# Patient Record
Sex: MALE | Race: BLACK OR AFRICAN AMERICAN | NOT HISPANIC OR LATINO | Employment: OTHER | URBAN - METROPOLITAN AREA
[De-identification: names, ages, dates, MRNs, and addresses within clinical notes are randomized per-mention and may not be internally consistent; named-entity substitution may affect disease eponyms.]

---

## 2020-09-08 ENCOUNTER — HOSPITAL ENCOUNTER (EMERGENCY)
Facility: HOSPITAL | Age: 65
Discharge: HOME | End: 2020-09-08
Attending: EMERGENCY MEDICINE
Payer: MEDICARE

## 2020-09-08 VITALS
RESPIRATION RATE: 18 BRPM | BODY MASS INDEX: 47.74 KG/M2 | HEIGHT: 68 IN | DIASTOLIC BLOOD PRESSURE: 60 MMHG | WEIGHT: 315 LBS | OXYGEN SATURATION: 98 % | HEART RATE: 76 BPM | SYSTOLIC BLOOD PRESSURE: 124 MMHG | TEMPERATURE: 98 F

## 2020-09-08 DIAGNOSIS — N18.6 ESRD (END STAGE RENAL DISEASE) ON DIALYSIS: ICD-10-CM

## 2020-09-08 DIAGNOSIS — N30.01 ACUTE CYSTITIS WITH HEMATURIA: Primary | ICD-10-CM

## 2020-09-08 DIAGNOSIS — Z99.2 ESRD (END STAGE RENAL DISEASE) ON DIALYSIS: ICD-10-CM

## 2020-09-08 LAB
ANION GAP SERPL CALC-SCNC: 17 MMOL/L (ref 8–16)
BACTERIA #/AREA URNS AUTO: ABNORMAL /HPF
BASOPHILS # BLD AUTO: 0.01 K/UL (ref 0–0.2)
BASOPHILS NFR BLD: 0.1 % (ref 0–1.9)
BILIRUB UR QL STRIP: NEGATIVE
BUN SERPL-MCNC: 97 MG/DL (ref 8–23)
CALCIUM SERPL-MCNC: 8.6 MG/DL (ref 8.7–10.5)
CHLORIDE SERPL-SCNC: 102 MMOL/L (ref 95–110)
CLARITY UR REFRACT.AUTO: ABNORMAL
CO2 SERPL-SCNC: 22 MMOL/L (ref 23–29)
COLOR UR AUTO: YELLOW
CREAT SERPL-MCNC: 8.4 MG/DL (ref 0.5–1.4)
DIFFERENTIAL METHOD: ABNORMAL
EOSINOPHIL # BLD AUTO: 0.3 K/UL (ref 0–0.5)
EOSINOPHIL NFR BLD: 3.8 % (ref 0–8)
ERYTHROCYTE [DISTWIDTH] IN BLOOD BY AUTOMATED COUNT: 17.1 % (ref 11.5–14.5)
EST. GFR  (AFRICAN AMERICAN): 7 ML/MIN/1.73 M^2
EST. GFR  (NON AFRICAN AMERICAN): 6.1 ML/MIN/1.73 M^2
GLUCOSE SERPL-MCNC: 171 MG/DL (ref 70–110)
GLUCOSE UR QL STRIP: ABNORMAL
HCT VFR BLD AUTO: 35.9 % (ref 40–54)
HGB BLD-MCNC: 10.1 G/DL (ref 14–18)
HGB UR QL STRIP: ABNORMAL
HYALINE CASTS UR QL AUTO: 2 /LPF
IMM GRANULOCYTES # BLD AUTO: 0.03 K/UL (ref 0–0.04)
IMM GRANULOCYTES NFR BLD AUTO: 0.4 % (ref 0–0.5)
KETONES UR QL STRIP: NEGATIVE
LEUKOCYTE ESTERASE UR QL STRIP: ABNORMAL
LYMPHOCYTES # BLD AUTO: 1 K/UL (ref 1–4.8)
LYMPHOCYTES NFR BLD: 13.7 % (ref 18–48)
MCH RBC QN AUTO: 26.6 PG (ref 27–31)
MCHC RBC AUTO-ENTMCNC: 28.1 G/DL (ref 32–36)
MCV RBC AUTO: 95 FL (ref 82–98)
MICROSCOPIC COMMENT: ABNORMAL
MONOCYTES # BLD AUTO: 0.5 K/UL (ref 0.3–1)
MONOCYTES NFR BLD: 6.7 % (ref 4–15)
NEUTROPHILS # BLD AUTO: 5.5 K/UL (ref 1.8–7.7)
NEUTROPHILS NFR BLD: 75.3 % (ref 38–73)
NITRITE UR QL STRIP: NEGATIVE
NRBC BLD-RTO: 0 /100 WBC
PH UR STRIP: 5 [PH] (ref 5–8)
PLATELET # BLD AUTO: 231 K/UL (ref 150–350)
PMV BLD AUTO: 10.3 FL (ref 9.2–12.9)
POTASSIUM SERPL-SCNC: 5.6 MMOL/L (ref 3.5–5.1)
PROT UR QL STRIP: ABNORMAL
RBC # BLD AUTO: 3.79 M/UL (ref 4.6–6.2)
RBC #/AREA URNS AUTO: 10 /HPF (ref 0–4)
SARS-COV-2 RDRP RESP QL NAA+PROBE: NEGATIVE
SODIUM SERPL-SCNC: 141 MMOL/L (ref 136–145)
SP GR UR STRIP: 1.01 (ref 1–1.03)
SQUAMOUS #/AREA URNS AUTO: 2 /HPF
URN SPEC COLLECT METH UR: ABNORMAL
WBC # BLD AUTO: 7.29 K/UL (ref 3.9–12.7)
WBC #/AREA URNS AUTO: >100 /HPF (ref 0–5)
WBC CLUMPS UR QL AUTO: ABNORMAL

## 2020-09-08 PROCEDURE — 85025 COMPLETE CBC W/AUTO DIFF WBC: CPT

## 2020-09-08 PROCEDURE — U0002 COVID-19 LAB TEST NON-CDC: HCPCS

## 2020-09-08 PROCEDURE — 99284 PR EMERGENCY DEPT VISIT,LEVEL IV: ICD-10-PCS | Mod: ,,, | Performed by: EMERGENCY MEDICINE

## 2020-09-08 PROCEDURE — 96365 THER/PROPH/DIAG IV INF INIT: CPT

## 2020-09-08 PROCEDURE — 99284 EMERGENCY DEPT VISIT MOD MDM: CPT | Mod: 25

## 2020-09-08 PROCEDURE — 80048 BASIC METABOLIC PNL TOTAL CA: CPT

## 2020-09-08 PROCEDURE — 99284 EMERGENCY DEPT VISIT MOD MDM: CPT | Mod: ,,, | Performed by: EMERGENCY MEDICINE

## 2020-09-08 PROCEDURE — 81001 URINALYSIS AUTO W/SCOPE: CPT

## 2020-09-08 PROCEDURE — 63600175 PHARM REV CODE 636 W HCPCS: Performed by: EMERGENCY MEDICINE

## 2020-09-08 PROCEDURE — 87086 URINE CULTURE/COLONY COUNT: CPT

## 2020-09-08 RX ORDER — DULOXETIN HYDROCHLORIDE 60 MG/1
60 CAPSULE, DELAYED RELEASE ORAL DAILY
COMMUNITY

## 2020-09-08 RX ORDER — OXYCODONE AND ACETAMINOPHEN 10; 325 MG/1; MG/1
1 TABLET ORAL EVERY 12 HOURS PRN
COMMUNITY

## 2020-09-08 RX ORDER — LOSARTAN POTASSIUM 25 MG/1
25 TABLET ORAL DAILY
COMMUNITY

## 2020-09-08 RX ORDER — INSULIN LISPRO 100 [IU]/ML
5 INJECTION, SOLUTION INTRAVENOUS; SUBCUTANEOUS
COMMUNITY

## 2020-09-08 RX ORDER — CEPHALEXIN 500 MG/1
500 CAPSULE ORAL EVERY 12 HOURS
Qty: 20 CAPSULE | Refills: 0 | Status: ON HOLD | OUTPATIENT
Start: 2020-09-08 | End: 2020-09-11 | Stop reason: HOSPADM

## 2020-09-08 RX ORDER — CALCIUM ACETATE 667 MG/1
2001 CAPSULE ORAL
COMMUNITY

## 2020-09-08 RX ORDER — INSULIN GLARGINE 100 [IU]/ML
25 INJECTION, SOLUTION SUBCUTANEOUS DAILY
COMMUNITY

## 2020-09-08 RX ORDER — FAMOTIDINE 20 MG/1
20 TABLET, FILM COATED ORAL DAILY
COMMUNITY

## 2020-09-08 RX ADMIN — CEFTRIAXONE 2 G: 2 INJECTION, SOLUTION INTRAVENOUS at 03:09

## 2020-09-08 NOTE — ED NOTES
Roddy Justice, a 65 y.o. male presents to the ED via personal transporation with CC states he is in town due to his nephew not doing well and needs to have dialysis.  Patient is from [place], [place] and he has dialysis and normally goes Tues, Thursday, and Saturday. This past Saturday.       Patient identifiers verified verbally with patient and correct for Roddy Justice.

## 2020-09-09 LAB — BACTERIA UR CULT: NO GROWTH

## 2020-09-09 NOTE — ED PROVIDER NOTES
Encounter Date: 9/8/2020       History     Chief Complaint   Patient presents with    Referral     Pt sent to ED for eval for dialysis.  Pt is from out of [facility] and reports that he needs covid test and cxr to get switched to her for dialysis.  Pt denies symptoms.  Last dialysis Sat.  Normal dialysis T,Th Sat     65-year-old male presents needing clearance for dialysis.  He is visiting from out of town.  He has a history of end-stage renal disease and is chronically on dialysis.  He has been trying to arrange follow-up with his home center.  They state he needs labs, chest x-ray, and a corona test to be able to get dialysis in oral and.  Patient was recently treated for a UTI and has persistent dysuria.  Besides this he denies nausea, vomiting, diarrhea, fever, cough, shortness of breath, chest pain, or abdominal pain.  He denies any other acute medical complaint.  A ten point review of systems was completed and is negative except as documented above.  The patients available PMH, PSH, Social History, medications, allergies, and triage vital signs were reviewed just prior to their medical evaluation.        Review of patient's allergies indicates:  No Known Allergies  Past Medical History:   Diagnosis Date    A-fib     COPD (chronic obstructive pulmonary disease)     Diabetes mellitus     Renal disorder      No past surgical history on file.  No family history on file.  Social History     Tobacco Use    Smoking status: Never Smoker    Smokeless tobacco: Never Used   Substance Use Topics    Alcohol use: Not Currently    Drug use: Never     Review of Systems   Constitutional: Negative for fever.   HENT: Negative for sore throat.    Eyes: Negative for visual disturbance.   Respiratory: Negative for cough and shortness of breath.    Cardiovascular: Negative for chest pain.   Gastrointestinal: Negative for abdominal pain, diarrhea, nausea and vomiting.   Genitourinary: Positive for dysuria.   Musculoskeletal:  Negative for neck pain.   Skin: Negative for rash and wound.   Allergic/Immunologic: Negative for immunocompromised state.   Neurological: Negative for syncope.   Psychiatric/Behavioral: Negative for confusion.       Physical Exam     Initial Vitals [09/08/20 1200]   BP Pulse Resp Temp SpO2   (!) 150/67 102 20 97.7 °F (36.5 °C) 96 %      MAP       --         Physical Exam    Nursing note and vitals reviewed.  Constitutional: He appears well-developed and well-nourished. He is not diaphoretic. No distress.   HENT:   Head: Normocephalic and atraumatic.   Nose: Nose normal.   Eyes: Conjunctivae are normal. Right eye exhibits no discharge. Left eye exhibits no discharge.   Neck: Normal range of motion. Neck supple.   Cardiovascular: Normal rate, regular rhythm and normal heart sounds. Exam reveals no gallop and no friction rub.    No murmur heard.  Pulmonary/Chest: Breath sounds normal. No respiratory distress. He has no wheezes. He has no rhonchi. He has no rales.   Abdominal: Soft. He exhibits no distension. There is no abdominal tenderness. There is no rebound and no guarding.   obese   Musculoskeletal: Normal range of motion. No tenderness or edema.   Neurological: He is alert and oriented to person, place, and time. He has normal strength. GCS score is 15. GCS eye subscore is 4. GCS verbal subscore is 5. GCS motor subscore is 6.   Skin: Skin is warm and dry. No rash noted. No erythema.   Psychiatric: He has a normal mood and affect. His behavior is normal. Judgment and thought content normal.         ED Course   Procedures  Labs Reviewed   CBC W/ AUTO DIFFERENTIAL - Abnormal; Notable for the following components:       Result Value    RBC 3.79 (*)     Hemoglobin 10.1 (*)     Hematocrit 35.9 (*)     Mean Corpuscular Hemoglobin 26.6 (*)     Mean Corpuscular Hemoglobin Conc 28.1 (*)     RDW 17.1 (*)     Gran% 75.3 (*)     Lymph% 13.7 (*)     All other components within normal limits   BASIC METABOLIC PANEL - Abnormal;  Notable for the following components:    Potassium 5.6 (*)     CO2 22 (*)     Glucose 171 (*)     BUN, Bld 97 (*)     Creatinine 8.4 (*)     Calcium 8.6 (*)     Anion Gap 17 (*)     eGFR if  7.0 (*)     eGFR if non  6.1 (*)     All other components within normal limits   URINALYSIS, REFLEX TO URINE CULTURE - Abnormal; Notable for the following components:    Appearance, UA Cloudy (*)     Protein, UA 2+ (*)     Glucose, UA 1+ (*)     Occult Blood UA 1+ (*)     Leukocytes, UA 3+ (*)     All other components within normal limits    Narrative:     Specimen Source->Urine   URINALYSIS MICROSCOPIC - Abnormal; Notable for the following components:    RBC, UA 10 (*)     WBC, UA >100 (*)     Hyaline Casts, UA 2 (*)     All other components within normal limits    Narrative:     Specimen Source->Urine   CULTURE, URINE   SARS-COV-2 RNA AMPLIFICATION, QUAL          Imaging Results          X-Ray Chest AP Portable (Final result)  Result time 09/08/20 14:20:11    Final result by Andrew Bowman MD (09/08/20 14:20:11)                 Impression:      Findings suggestive of central vascular congestion.  Perihilar interstitial opacities could reflect edema, although infectious or inflammatory infiltrate could appear similar      Electronically signed by: Andrew Bowman  Date:    09/08/2020  Time:    14:20             Narrative:    EXAMINATION:  XR CHEST AP PORTABLE    CLINICAL HISTORY:  End stage renal disease    TECHNIQUE:  Single frontal view of the chest was performed.    COMPARISON:  Chest radiograph performed 07/11/2017    FINDINGS:  Cardiomediastinal silhouette appears unchanged, the borderline cardiomegaly.  Elevation of the right hemidiaphragm, as before.  Ill-defined perihilar interstitial opacities are noted with prominence of the central pulmonary vasculature.  No focal airspace consolidation is suggested.  No definite pneumothorax or pleural effusion.  Visualized upper abdomen without  definite acute findings.  Soft tissues and bones without acute change.                                 Medical Decision Making:   History:   Old Medical Records: I decided to obtain old medical records.  Clinical Tests:   Lab Tests: Ordered and Reviewed  Radiological Study: Ordered and Reviewed  ED Management:  65-year-old male presents with dysuria and needing medical clearance for dialysis.  Vitals normal.  Physical exam benign.  Labs consistent with end-stage renal disease and likely UTI.  Treated with antibiotics.  Coronavirus negative and chest x-ray unremarkable.  Given results to supplied to outpatient dialysis center.  He has not missed any dialysis sessions and thus does not need emergent dialysis.  He will return to the ED if unable to get dialysis.  Patient will return to ED for worsening symptoms, inability to eat/drink, fever greater than 100.4, or any other concerns.  Did bedside teaching with return precautions.  All questions answered.  The patient acknowledges understanding.  Gave verbal discharge instructions.                             Clinical Impression:   Final diagnoses:  [N18.6, Z99.2] ESRD (end stage renal disease) on dialysis  [N30.01] Acute cystitis with hematuria (Primary)      Disposition:   Disposition: Discharged  Condition: Stable     ED Disposition Condition    Discharge Stable        ED Prescriptions     Medication Sig Dispense Start Date End Date Auth. Provider    cephALEXin (KEFLEX) 500 MG capsule Take 1 capsule (500 mg total) by mouth every 12 (twelve) hours. On dialysis days, take after dialysis for 10 days 20 capsule 9/8/2020 9/18/2020 Sukhjinder Khan MD        Follow-up Information     Follow up With Specialties Details Why Contact Info    Follow up with primary physician as soon as possible.  Call tomorrow for an appointment.        [facility]  Return to ED for worsening symptoms, inability to eat/drink, fever greater than 100.4, or  any other concerns. [address] [facility] [id number]  [phone]                                       Sukhjinder Khan MD  09/09/20 0912

## 2020-09-10 ENCOUNTER — HOSPITAL ENCOUNTER (OUTPATIENT)
Facility: OTHER | Age: 65
Discharge: HOME | End: 2020-09-11
Attending: EMERGENCY MEDICINE | Admitting: INTERNAL MEDICINE
Payer: MEDICARE

## 2020-09-10 DIAGNOSIS — E87.5 HYPERKALEMIA: ICD-10-CM

## 2020-09-10 DIAGNOSIS — N18.6 ESRD (END STAGE RENAL DISEASE): ICD-10-CM

## 2020-09-10 DIAGNOSIS — Z78.9 PROBLEM WITH VASCULAR ACCESS: Primary | ICD-10-CM

## 2020-09-10 PROBLEM — J44.9 COPD (CHRONIC OBSTRUCTIVE PULMONARY DISEASE): Status: ACTIVE | Noted: 2020-09-10

## 2020-09-10 PROBLEM — G47.33 OSA (OBSTRUCTIVE SLEEP APNEA): Status: ACTIVE | Noted: 2018-03-14

## 2020-09-10 PROBLEM — I48.0 PAROXYSMAL ATRIAL FIBRILLATION: Status: ACTIVE | Noted: 2020-09-10

## 2020-09-10 PROBLEM — E11.9 TYPE 2 DIABETES MELLITUS, WITH LONG-TERM CURRENT USE OF INSULIN: Status: ACTIVE | Noted: 2020-09-10

## 2020-09-10 PROBLEM — I48.91 ATRIAL FIBRILLATION: Status: ACTIVE | Noted: 2020-09-10

## 2020-09-10 PROBLEM — I10 ESSENTIAL HYPERTENSION: Status: ACTIVE | Noted: 2020-09-10

## 2020-09-10 PROBLEM — Z79.4 TYPE 2 DIABETES MELLITUS, WITH LONG-TERM CURRENT USE OF INSULIN: Status: ACTIVE | Noted: 2020-09-10

## 2020-09-10 PROBLEM — I50.32 CHRONIC DIASTOLIC CHF (CONGESTIVE HEART FAILURE): Status: ACTIVE | Noted: 2020-09-10

## 2020-09-10 LAB
ALBUMIN SERPL BCP-MCNC: 3.3 G/DL (ref 3.5–5.2)
ALP SERPL-CCNC: 92 U/L (ref 55–135)
ALT SERPL W/O P-5'-P-CCNC: 11 U/L (ref 10–44)
ANION GAP SERPL CALC-SCNC: 18 MMOL/L (ref 8–16)
AST SERPL-CCNC: 18 U/L (ref 10–40)
BASOPHILS # BLD AUTO: 0.01 K/UL (ref 0–0.2)
BASOPHILS NFR BLD: 0.1 % (ref 0–1.9)
BILIRUB SERPL-MCNC: 0.5 MG/DL (ref 0.1–1)
BUN SERPL-MCNC: 112 MG/DL (ref 8–23)
CALCIUM SERPL-MCNC: 8.7 MG/DL (ref 8.7–10.5)
CHLORIDE SERPL-SCNC: 102 MMOL/L (ref 95–110)
CO2 SERPL-SCNC: 18 MMOL/L (ref 23–29)
CREAT SERPL-MCNC: 9 MG/DL (ref 0.5–1.4)
DIFFERENTIAL METHOD: ABNORMAL
EOSINOPHIL # BLD AUTO: 0.4 K/UL (ref 0–0.5)
EOSINOPHIL NFR BLD: 4.5 % (ref 0–8)
ERYTHROCYTE [DISTWIDTH] IN BLOOD BY AUTOMATED COUNT: 16.9 % (ref 11.5–14.5)
EST. GFR  (AFRICAN AMERICAN): 6 ML/MIN/1.73 M^2
EST. GFR  (NON AFRICAN AMERICAN): 6 ML/MIN/1.73 M^2
GLUCOSE SERPL-MCNC: 119 MG/DL (ref 70–110)
HCT VFR BLD AUTO: 35 % (ref 40–54)
HCT VFR BLD CALC: 34 %PCV (ref 36–54)
HGB BLD-MCNC: 10.1 G/DL (ref 14–18)
HGB BLD-MCNC: 12 G/DL
IMM GRANULOCYTES # BLD AUTO: 0.03 K/UL (ref 0–0.04)
IMM GRANULOCYTES NFR BLD AUTO: 0.4 % (ref 0–0.5)
LYMPHOCYTES # BLD AUTO: 0.6 K/UL (ref 1–4.8)
LYMPHOCYTES NFR BLD: 7.9 % (ref 18–48)
MCH RBC QN AUTO: 26.6 PG (ref 27–31)
MCHC RBC AUTO-ENTMCNC: 28.9 G/DL (ref 32–36)
MCV RBC AUTO: 92 FL (ref 82–98)
MONOCYTES # BLD AUTO: 0.6 K/UL (ref 0.3–1)
MONOCYTES NFR BLD: 7.7 % (ref 4–15)
NEUTROPHILS # BLD AUTO: 6.2 K/UL (ref 1.8–7.7)
NEUTROPHILS NFR BLD: 79.4 % (ref 38–73)
NRBC BLD-RTO: 0 /100 WBC
PLATELET # BLD AUTO: 205 K/UL (ref 150–350)
PLATELET BLD QL SMEAR: ABNORMAL
PMV BLD AUTO: 9.8 FL (ref 9.2–12.9)
POC IONIZED CALCIUM: 1.13 MMOL/L (ref 1.06–1.42)
POCT GLUCOSE: 112 MG/DL (ref 70–110)
POCT GLUCOSE: 58 MG/DL (ref 70–110)
POLYCHROMASIA BLD QL SMEAR: ABNORMAL
POTASSIUM BLD-SCNC: 5.6 MMOL/L (ref 3.5–5.1)
POTASSIUM SERPL-SCNC: 6.1 MMOL/L (ref 3.5–5.1)
PROT SERPL-MCNC: 7.7 G/DL (ref 6–8.4)
RBC # BLD AUTO: 3.8 M/UL (ref 4.6–6.2)
SAMPLE: ABNORMAL
SITE: ABNORMAL
SODIUM BLD-SCNC: 138 MMOL/L (ref 136–145)
SODIUM SERPL-SCNC: 138 MMOL/L (ref 136–145)
WBC # BLD AUTO: 7.81 K/UL (ref 3.9–12.7)

## 2020-09-10 PROCEDURE — 27000190 HC CPAP FULL FACE MASK W/VALVE

## 2020-09-10 PROCEDURE — 85025 COMPLETE CBC W/AUTO DIFF WBC: CPT

## 2020-09-10 PROCEDURE — G0257 UNSCHED DIALYSIS ESRD PT HOS: HCPCS

## 2020-09-10 PROCEDURE — C9399 UNCLASSIFIED DRUGS OR BIOLOG: HCPCS | Performed by: INTERNAL MEDICINE

## 2020-09-10 PROCEDURE — 99220 PR INITIAL OBSERVATION CARE,LEVL III: CPT | Mod: ,,, | Performed by: INTERNAL MEDICINE

## 2020-09-10 PROCEDURE — 93005 ELECTROCARDIOGRAM TRACING: CPT

## 2020-09-10 PROCEDURE — 85014 HEMATOCRIT: CPT

## 2020-09-10 PROCEDURE — G0378 HOSPITAL OBSERVATION PER HR: HCPCS

## 2020-09-10 PROCEDURE — 84132 ASSAY OF SERUM POTASSIUM: CPT

## 2020-09-10 PROCEDURE — 93010 ELECTROCARDIOGRAM REPORT: CPT | Mod: ,,, | Performed by: INTERNAL MEDICINE

## 2020-09-10 PROCEDURE — 96372 THER/PROPH/DIAG INJ SC/IM: CPT

## 2020-09-10 PROCEDURE — 80053 COMPREHEN METABOLIC PANEL: CPT

## 2020-09-10 PROCEDURE — 90935 HEMODIALYSIS ONE EVALUATION: CPT

## 2020-09-10 PROCEDURE — 94660 CPAP INITIATION&MGMT: CPT

## 2020-09-10 PROCEDURE — 25000003 PHARM REV CODE 250: Performed by: NURSE PRACTITIONER

## 2020-09-10 PROCEDURE — 99285 EMERGENCY DEPT VISIT HI MDM: CPT | Mod: 25

## 2020-09-10 PROCEDURE — 93010 EKG 12-LEAD: ICD-10-PCS | Mod: ,,, | Performed by: INTERNAL MEDICINE

## 2020-09-10 PROCEDURE — 82803 BLOOD GASES ANY COMBINATION: CPT

## 2020-09-10 PROCEDURE — 94761 N-INVAS EAR/PLS OXIMETRY MLT: CPT

## 2020-09-10 PROCEDURE — 84295 ASSAY OF SERUM SODIUM: CPT

## 2020-09-10 PROCEDURE — 99900035 HC TECH TIME PER 15 MIN (STAT)

## 2020-09-10 PROCEDURE — 82330 ASSAY OF CALCIUM: CPT

## 2020-09-10 PROCEDURE — 25000003 PHARM REV CODE 250: Performed by: INTERNAL MEDICINE

## 2020-09-10 PROCEDURE — 99220 PR INITIAL OBSERVATION CARE,LEVL III: ICD-10-PCS | Mod: ,,, | Performed by: INTERNAL MEDICINE

## 2020-09-10 RX ORDER — INSULIN ASPART 100 [IU]/ML
0-5 INJECTION, SOLUTION INTRAVENOUS; SUBCUTANEOUS
Status: DISCONTINUED | OUTPATIENT
Start: 2020-09-10 | End: 2020-09-11 | Stop reason: HOSPADM

## 2020-09-10 RX ORDER — SODIUM CHLORIDE 0.9 % (FLUSH) 0.9 %
10 SYRINGE (ML) INJECTION
Status: DISCONTINUED | OUTPATIENT
Start: 2020-09-10 | End: 2020-09-10

## 2020-09-10 RX ORDER — FLUTICASONE FUROATE AND VILANTEROL 100; 25 UG/1; UG/1
1 POWDER RESPIRATORY (INHALATION) DAILY
Status: DISCONTINUED | OUTPATIENT
Start: 2020-09-10 | End: 2020-09-11 | Stop reason: HOSPADM

## 2020-09-10 RX ORDER — ATORVASTATIN CALCIUM 10 MG/1
10 TABLET, FILM COATED ORAL NIGHTLY
Status: DISCONTINUED | OUTPATIENT
Start: 2020-09-10 | End: 2020-09-11 | Stop reason: HOSPADM

## 2020-09-10 RX ORDER — SODIUM CHLORIDE 0.9 % (FLUSH) 0.9 %
10 SYRINGE (ML) INJECTION
Status: DISCONTINUED | OUTPATIENT
Start: 2020-09-10 | End: 2020-09-11 | Stop reason: HOSPADM

## 2020-09-10 RX ORDER — SODIUM CHLORIDE 9 MG/ML
INJECTION, SOLUTION INTRAVENOUS ONCE
Status: DISCONTINUED | OUTPATIENT
Start: 2020-09-10 | End: 2020-09-11 | Stop reason: HOSPADM

## 2020-09-10 RX ORDER — MUPIROCIN 20 MG/G
OINTMENT TOPICAL 2 TIMES DAILY
Status: DISCONTINUED | OUTPATIENT
Start: 2020-09-10 | End: 2020-09-11 | Stop reason: HOSPADM

## 2020-09-10 RX ORDER — ATORVASTATIN CALCIUM 10 MG/1
10 TABLET, FILM COATED ORAL NIGHTLY
COMMUNITY
Start: 2020-09-05

## 2020-09-10 RX ORDER — IBUPROFEN 200 MG
24 TABLET ORAL
Status: DISCONTINUED | OUTPATIENT
Start: 2020-09-10 | End: 2020-09-11 | Stop reason: HOSPADM

## 2020-09-10 RX ORDER — ACETAMINOPHEN 325 MG/1
650 TABLET ORAL EVERY 8 HOURS PRN
Status: DISCONTINUED | OUTPATIENT
Start: 2020-09-10 | End: 2020-09-10

## 2020-09-10 RX ORDER — ALBUTEROL SULFATE 90 UG/1
2 AEROSOL, METERED RESPIRATORY (INHALATION) EVERY 4 HOURS PRN
Status: DISCONTINUED | OUTPATIENT
Start: 2020-09-10 | End: 2020-09-11 | Stop reason: HOSPADM

## 2020-09-10 RX ORDER — DULOXETIN HYDROCHLORIDE 30 MG/1
60 CAPSULE, DELAYED RELEASE ORAL DAILY
Status: DISCONTINUED | OUTPATIENT
Start: 2020-09-10 | End: 2020-09-11 | Stop reason: HOSPADM

## 2020-09-10 RX ORDER — OXYCODONE AND ACETAMINOPHEN 10; 325 MG/1; MG/1
1 TABLET ORAL EVERY 12 HOURS PRN
Status: DISCONTINUED | OUTPATIENT
Start: 2020-09-10 | End: 2020-09-11 | Stop reason: HOSPADM

## 2020-09-10 RX ORDER — LOSARTAN POTASSIUM 25 MG/1
25 TABLET ORAL DAILY
Status: DISCONTINUED | OUTPATIENT
Start: 2020-09-10 | End: 2020-09-11 | Stop reason: HOSPADM

## 2020-09-10 RX ORDER — METOPROLOL TARTRATE 25 MG/1
25 TABLET, FILM COATED ORAL 2 TIMES DAILY
COMMUNITY
Start: 2020-08-09

## 2020-09-10 RX ORDER — IBUPROFEN 200 MG
16 TABLET ORAL
Status: DISCONTINUED | OUTPATIENT
Start: 2020-09-10 | End: 2020-09-11 | Stop reason: HOSPADM

## 2020-09-10 RX ORDER — ALBUTEROL SULFATE 90 UG/1
1 AEROSOL, METERED RESPIRATORY (INHALATION) EVERY 4 HOURS PRN
COMMUNITY
End: 2020-09-10 | Stop reason: CLARIF

## 2020-09-10 RX ORDER — GLUCAGON 1 MG
1 KIT INJECTION
Status: DISCONTINUED | OUTPATIENT
Start: 2020-09-10 | End: 2020-09-11 | Stop reason: HOSPADM

## 2020-09-10 RX ORDER — ACETAMINOPHEN 325 MG/1
650 TABLET ORAL EVERY 4 HOURS PRN
Status: DISCONTINUED | OUTPATIENT
Start: 2020-09-10 | End: 2020-09-11 | Stop reason: HOSPADM

## 2020-09-10 RX ORDER — CALCIUM ACETATE 667 MG/1
2001 CAPSULE ORAL
Status: DISCONTINUED | OUTPATIENT
Start: 2020-09-10 | End: 2020-09-11 | Stop reason: HOSPADM

## 2020-09-10 RX ORDER — METOPROLOL TARTRATE 25 MG/1
25 TABLET, FILM COATED ORAL 2 TIMES DAILY
Status: DISCONTINUED | OUTPATIENT
Start: 2020-09-10 | End: 2020-09-11 | Stop reason: HOSPADM

## 2020-09-10 RX ORDER — ALBUTEROL SULFATE 90 UG/1
2 AEROSOL, METERED RESPIRATORY (INHALATION) EVERY 4 HOURS PRN
COMMUNITY

## 2020-09-10 RX ORDER — FAMOTIDINE 20 MG/1
20 TABLET, FILM COATED ORAL DAILY
Status: DISCONTINUED | OUTPATIENT
Start: 2020-09-10 | End: 2020-09-11 | Stop reason: HOSPADM

## 2020-09-10 RX ORDER — ONDANSETRON 2 MG/ML
4 INJECTION INTRAMUSCULAR; INTRAVENOUS EVERY 6 HOURS PRN
Status: DISCONTINUED | OUTPATIENT
Start: 2020-09-10 | End: 2020-09-11 | Stop reason: HOSPADM

## 2020-09-10 RX ORDER — INSULIN ASPART 100 [IU]/ML
5 INJECTION, SOLUTION INTRAVENOUS; SUBCUTANEOUS
Status: DISCONTINUED | OUTPATIENT
Start: 2020-09-10 | End: 2020-09-11 | Stop reason: HOSPADM

## 2020-09-10 RX ORDER — FLUTICASONE PROPIONATE AND SALMETEROL 100; 50 UG/1; UG/1
1 POWDER RESPIRATORY (INHALATION) 2 TIMES DAILY
COMMUNITY

## 2020-09-10 RX ADMIN — SODIUM ZIRCONIUM CYCLOSILICATE 10 G: 10 POWDER, FOR SUSPENSION ORAL at 11:09

## 2020-09-10 RX ADMIN — MUPIROCIN: 20 OINTMENT TOPICAL at 08:09

## 2020-09-10 RX ADMIN — CALCIUM ACETATE 2001 MG: 667 CAPSULE ORAL at 08:09

## 2020-09-10 RX ADMIN — ATORVASTATIN CALCIUM 10 MG: 10 TABLET, FILM COATED ORAL at 08:09

## 2020-09-10 RX ADMIN — METOPROLOL TARTRATE 25 MG: 25 TABLET, FILM COATED ORAL at 08:09

## 2020-09-10 RX ADMIN — INSULIN DETEMIR 15 UNITS: 100 INJECTION, SOLUTION SUBCUTANEOUS at 09:09

## 2020-09-10 RX ADMIN — APIXABAN 5 MG: 2.5 TABLET, FILM COATED ORAL at 08:09

## 2020-09-10 RX ADMIN — FAMOTIDINE 20 MG: 20 TABLET ORAL at 08:09

## 2020-09-10 NOTE — ASSESSMENT & PLAN NOTE
Unable to access AV fistula at outpatient dialysis unit  No issues with accessing fistula in the past. Pt reports fistula is deep making it difficult to access  To HD today to attempt to access deep AV fistula  If unable to dialyze via AV fistula, my require fistulogram or temporary tunneled catheter  Nephrology consulted. Discussed with Mike Grayson

## 2020-09-10 NOTE — PLAN OF CARE
09/10/20 1716   ALTMAN Message   Medicare Outpatient and Observation Notification regarding financial responsibility Given to patient/caregiver;Explained to patient/caregiver;Signed/date by patient/caregiver   Date ALTMAN was signed 09/10/20   Time ALTMAN was signed 1517  (approx time)

## 2020-09-10 NOTE — PLAN OF CARE
RN CM met with patient at the bedside.  Pt states he lives in [facility]  is Hd Patient there- however his nephew has brain tumor and he was visiting his brother in [place] , for a while and transferred his HD, [facility] to [facility] - but is having issues with his graft to During his HD session.    Patient is alert and oriented with no communication barriers.      Prior to admission patient was independent with ADLs. Patient denies the use of HH or DME .    Patients  Needs PCP  lives in [facility]..says he does  have one  there ?   Maybe [facility] referral for  local PCP until his return back to [facility] may help  . Patient choice pharmacy  he  is unsure.p pharmacy may work better for him      Patient denies a history of mental illness.         Patients family will transport him home at discharge.        CM team will continue to follow.      09/10/20 5897   Discharge Assessment   Assessment Type Discharge Planning Assessment   Confirmed/corrected address and phone number on facesheet? Yes   Assessment information obtained from? Patient   Communicated expected length of stay with patient/caregiver yes   Prior to hospitilization cognitive status: Alert/Oriented   Prior to hospitalization functional status: Independent;Assistive Equipment   Current cognitive status: Alert/Oriented   Current Functional Status: Independent;Assistive Equipment   Lives With significant other   Able to Return to Prior Arrangements yes   Is patient able to care for self after discharge? Yes   Readmission Within the Last 30 Days no previous admission in last 30 days   Patient currently being followed by outpatient case management? No   Patient currently receives any other outside agency services? No   Equipment Currently Used at Home cane, quad   Do you have any problems affording any of your prescribed medications? TBD   Is the patient taking medications as prescribed? yes   Does the patient have transportation home?  Yes   Does the patient receive services at the [facility]? No   Discharge Plan A Home with family   Discharge Plan B Home with family   DME Needed Upon Discharge  none   Patient/Family in Agreement with Plan yes

## 2020-09-10 NOTE — H&P
[facility] Hospital Medicine  History & Physical    Patient Name: Roddy Justice  MRN: [id number]  Admission Date: 9/10/2020  Attending Physician: Karina Salmon MD   Primary Care Provider: Primary Doctor No         Patient information was obtained from patient, past medical records and ER records.     Subjective:     Principal Problem:Problem with vascular access    Chief Complaint:   Chief Complaint   Patient presents with    Vascular Access Problem     unable to get dialysis today because port was clogged. last dialysis last Saturday.         HPI: 66 y/o M with history of ESRD on TTS dialysis presents to the ED today from outpatient dialysis unit with difficulty accessing AV fistula. He is visiting from [place], [place] due to a sick nephew. He went to outpatient unit today and they were unable to access his fistula so he was referred to the ED. He complains of worsening SOB over the past 3 days due to missed HD. His last dialysis was 5 days ago. He was seen in the ED at [facility] on 9/8 because he needed a COVID test prior to having dialysis. At the time he also had dysuria, treated for UTI and symptoms have since resolved. He denies chest pain, cough, fever, chills, nausea or vomiting.    He has history of diastolic CHF, COPD, type 2 diabetes mellitus, ESRD on TTS HD, HTN, atrial fibrillation on chronic anticoagulation and SILVINA on BiPAP and 3 L nasal cannula as needed at home.    Surgical history: b/l toe amputations, prior trach placement, L AV fistula  Social history: lives with his common law wife, no children. Denies alcohol or drug use. Prior tobacco user, quit 10 years ago  Family history: mother and father with DM2 and HTN      Past Medical History:   Diagnosis Date    A-fib     COPD (chronic obstructive pulmonary disease)     Diabetes mellitus     Renal disorder        Past Surgical History:   Procedure Laterality Date    avf left arm         Review of patient's allergies indicates:    Allergen Reactions    Gabapentin Anaphylaxis       No current facility-administered medications on file prior to encounter.      Current Outpatient Medications on File Prior to Encounter   Medication Sig    apixaban (ELIQUIS) 5 mg Tab Take 5 mg by mouth 2 (two) times daily.    calcium acetate,phosphat bind, (PHOSLO) 667 mg capsule Take 2,001 mg by mouth 3 (three) times daily with meals.     DULoxetine (CYMBALTA) 60 MG capsule Take 60 mg by mouth once daily.    famotidine (PEPCID) 20 MG tablet Take 20 mg by mouth once daily.     insulin (BASAGLAR KWIKPEN U-100 INSULIN) glargine 100 units/mL (3mL) SubQ pen Inject 25 Units into the skin once daily.     insulin lispro 100 unit/mL injection Inject 5 Units into the skin 3 (three) times daily before meals. Plus SSI    losartan (COZAAR) 25 MG tablet Take 25 mg by mouth once daily.    [DISCONTINUED] insulin glargine,hum.rec.anlog (BASAGLAR KWIKPEN U-100 INSULIN SUBQ) Inject 5 Units into the skin 3 (three) times daily with meals. Plus SSI    albuterol (PROVENTIL/VENTOLIN HFA) 90 mcg/actuation inhaler Inhale 2 puffs into the lungs every 4 (four) hours as needed.    atorvastatin (LIPITOR) 10 MG tablet Take 10 mg by mouth every evening.    cephALEXin (KEFLEX) 500 MG capsule Take 1 capsule (500 mg total) by mouth every 12 (twelve) hours. On dialysis days, take after dialysis for 10 days    fluticasone-salmeterol diskus inhaler 100-50 mcg Inhale 1 puff into the lungs 2 (two) times a day.    metoprolol tartrate (LOPRESSOR) 25 MG tablet Take 25 mg by mouth 2 (two) times a day.    oxyCODONE-acetaminophen (PERCOCET)  mg per tablet Take 1 tablet by mouth every 12 (twelve) hours as needed for Pain.     [DISCONTINUED] albuterol (PROVENTIL/VENTOLIN HFA) 90 mcg/actuation inhaler Inhale 1 puff into the lungs every 4 (four) hours as needed.     Family History     None        Tobacco Use    Smoking status: Never Smoker    Smokeless tobacco: Never Used   Substance and  Sexual Activity    Alcohol use: Not Currently    Drug use: Never    Sexual activity: Not on file     Review of Systems   Constitutional: Negative for chills and fever.   HENT: Negative.    Eyes: Negative.    Respiratory: Positive for shortness of breath. Negative for cough.    Cardiovascular: Negative for chest pain and palpitations.   Gastrointestinal: Negative for abdominal distention, abdominal pain, diarrhea, nausea and vomiting.   Genitourinary: Negative.    Musculoskeletal: Negative.    Neurological: Negative.    Psychiatric/Behavioral: Negative.    All other systems reviewed and are negative.    Objective:     Vital Signs (Most Recent):  Pulse: 80 (09/10/20 1234)  Resp: (!) 22 (09/10/20 1201)  BP: (!) 114/55 (09/10/20 1233)  SpO2: 95 % (09/10/20 1233) Vital Signs (24h Range):  Pulse:  [77-87] 80  Resp:  [16-22] 22  SpO2:  [95 %-99 %] 95 %  BP: (114-160)/(55-79) 114/55        There is no height or weight on file to calculate BMI.    Physical Exam  Vitals signs and nursing note reviewed.   Constitutional:       Appearance: He is well-developed. He is obese.   HENT:      Head: Normocephalic and atraumatic.   Eyes:      General:         Right eye: No discharge.         Left eye: No discharge.      Conjunctiva/sclera: Conjunctivae normal.   Neck:      Musculoskeletal: Normal range of motion and neck supple.   Cardiovascular:      Rate and Rhythm: Normal rate and regular rhythm.      Heart sounds: Normal heart sounds.   Pulmonary:      Effort: Pulmonary effort is normal. No respiratory distress.      Comments: Decreased   Abdominal:      General: Bowel sounds are normal. There is no distension.      Palpations: Abdomen is soft.      Tenderness: There is no abdominal tenderness.   Musculoskeletal: Normal range of motion.      Right lower leg: No edema.      Left lower leg: No edema.      Comments: L AV fistula - no thrill palpated but bruit auscultated   Skin:     General: Skin is warm and dry.   Neurological:       Mental Status: He is alert and oriented to person, place, and time. Mental status is at baseline.   Psychiatric:         Mood and Affect: Mood normal.         Behavior: Behavior normal.             Significant Labs:   CBC:   Recent Labs   Lab 09/10/20  0944 09/10/20  0944   WBC 7.81  --    HGB 10.1*  --    HCT 35.0* 34*     --      CMP:   Recent Labs   Lab 09/10/20  0944      K 6.1*      CO2 18*   *   *   CREATININE 9.0*   CALCIUM 8.7   PROT 7.7   ALBUMIN 3.3*   BILITOT 0.5   ALKPHOS 92   AST 18   ALT 11   ANIONGAP 18*   EGFRNONAA 6*     All pertinent labs within the past 24 hours have been reviewed.    Significant Imaging: I have reviewed and interpreted all pertinent imaging results/findings within the past 24 hours.    Assessment/Plan:     * Problem with vascular access  Unable to access AV fistula at outpatient dialysis unit  No issues with accessing fistula in the past. Pt reports fistula is deep making it difficult to access  To HD today to attempt to access deep AV fistula  If unable to dialyze via AV fistula, my require fistulogram or temporary tunneled catheter  Nephrology consulted. Discussed with Mike Grayson      Paroxysmal atrial fibrillation  Continue eliquis and metoprolol       COPD (chronic obstructive pulmonary disease)  Stable, resume home inhalers      Type 2 diabetes mellitus, with long-term current use of insulin  Start Levemir 15 units qHS, continue aspart 5 units TIDWM  Low dose SSI      ESRD (end stage renal disease)  On TTS HD  Nephrology consulted for dialysis      Chronic diastolic CHF (congestive heart failure)  Overloaded due to missed HD  Planning HD today      SILVINA (obstructive sleep apnea)  Resume BiPAP qHS      Essential hypertension  Controlled  Continue losartan and metoprolol        VTE Risk Mitigation (From admission, onward)         Ordered     apixaban tablet 5 mg  2 times daily      09/10/20 1403     Place sequential compression device   Until discontinued      09/10/20 1404     IP VTE HIGH RISK PATIENT  Once      09/10/20 1404                   Karina Salmon MD  Department of Hospital Medicine   [facility]

## 2020-09-10 NOTE — CONSULTS
Consult Note  Nephrology    Consult Requested By: Karina Salmon MD  Reason for Consult: ESRD    SUBJECTIVE:     History of Present Illness:  Patient is a 65 y.o. male presents with difficulty accessing AV fistula at outpatient dialysis unit.  Patient is here visiting from [facility] for his dying nephew.  Patient presented to [facility] with Dr. Le and dialysis nurse was unable to access venous access after numerous attempts.  Patient directed to the emergency room for evaluation.  Consulted for dialysis needs.  Discussed case with Dr. Le and emergency room physician.  Patient seen and examined.  Labs noted.  Morbidly obese gentleman in no acute distress.  Left arm AV fistula evaluated with superficial arterial thrill and bruit but extremely deep venous track.  Updated dialysis team about access issues.  Explained to patient that he may need either fistulogram or temporary tunneled catheter.  Updated [facility] interventional team.    Epic reviewed.    Currently without chest pain, nausea, vomiting, diarrhea, fever, chills.  Subjective shortness of breath.    Past Medical History:   Diagnosis Date    A-fib     COPD (chronic obstructive pulmonary disease)     Diabetes mellitus     Renal disorder      Past Surgical History:   Procedure Laterality Date    avf left arm       No family history on file.  Social History     Tobacco Use    Smoking status: Never Smoker    Smokeless tobacco: Never Used   Substance Use Topics    Alcohol use: Not Currently    Drug use: Never       Review of patient's allergies indicates:   Allergen Reactions    Gabapentin Anaphylaxis        Review of Systems:  Constitutional: No fever or chills  Respiratory: shortness of breath  Cardiovascular: No chest pain or palpitations  Gastrointestinal: No nausea or vomiting  Neurological: No confusion or weakness    OBJECTIVE:     Vital Signs (Most Recent)  Pulse: 82 (09/10/20 1021)  Resp: 17 (09/10/20 0956)  BP: 137/79 (09/10/20  1021)  SpO2: 96 % (09/10/20 1021)    Vital Signs Range (Last 24H):  Pulse:  [80-87]   Resp:  [17-20]   BP: (137-160)/(69-79)   SpO2:  [96 %-99 %]     No intake or output data in the 24 hours ending 09/10/20 1029    Physical Exam:  General appearance: Well developed, well nourished  Eyes:  Conjunctivae/corneas clear. PERRL.  Lungs: Normal respiratory effort,   mild bibasilar rales  Heart:  AFib Regular rate and rhythm, S1, S2 normal, no murmur, rub or ingrid.  Abdomen: Soft, non-tender non-distended; bowel sounds normal; no masses,  no organomegaly, obese  Extremities: No cyanosis or clubbing.  2+ edema.    Skin: Skin color, texture, turgor normal. No rashes or lesions  Neurologic: Normal strength and tone. No focal numbness or weakness   Left arm AV fistula with very deep track.  Positive thrill and bruit.    Laboratory:  Recent Labs   Lab 09/10/20  0944 09/10/20  0944   WBC 7.81  --    RBC 3.80*  --    HGB 10.1*  --    HCT 35.0* 34*     --    MCV 92  --    MCH 26.6*  --    MCHC 28.9*  --      BMP:   Recent Labs   Lab 09/08/20  1232   *      K 5.6*      CO2 22*   BUN 97*   CREATININE 8.4*   CALCIUM 8.6*     Lab Results   Component Value Date    CALCIUM 8.6 (L) 09/08/2020    PHOS 3.4 05/15/2010     BNP  No results for input(s): BNP, BNPTRIAGEBLO in the last 168 hours.No results found for: URICACID  Lab Results   Component Value Date    IRON 36 (L) 02/24/2010    TIBC 238 (L) 02/24/2010     Lab Results   Component Value Date    CALCIUM 8.6 (L) 09/08/2020    PHOS 3.4 05/15/2010       Diagnostic Results:  No orders to display       ASSESSMENT/PLAN:     1. End-stage renal disease on hemodialysis Tuesday Thursday and Saturday and [facility] but here for visitation with malfunctioning AV fistula (N18.6, Z99.2, Z78.9):  Patient has been on dialysis for a little over a year secondary to diabetic and hypertensive nephrosclerosis.  According to patient his outpatient unit in [place] does not have  trouble accessing his fistula.  Patient currently at temporary location of [facility] with Dr. Le.  I discussed with him and with [facility] interventional team about plans.  Consent signed for dialysis.  Updated dialysis RN.  Will attempt access to deep AV fistula but may need either a fistulogram or tunneled catheter placed.  See orders. Renally dose meds, avoid nephrotoxins, and monitor I/O's closely.  2. Mild hyperkalemia (E87.5):  We will correct with dialysis.  Will give dose of lokelma now.    3. COPD:  Defer.  4. Anemia of CKD:  At goal.   5. Afib on Eliquis:  Defer.       Thanks for consult  See above  Will follow along.

## 2020-09-10 NOTE — SUBJECTIVE & OBJECTIVE
Past Medical History:   Diagnosis Date    A-fib     COPD (chronic obstructive pulmonary disease)     Diabetes mellitus     Renal disorder        Past Surgical History:   Procedure Laterality Date    avf left arm         Review of patient's allergies indicates:   Allergen Reactions    Gabapentin Anaphylaxis       No current facility-administered medications on file prior to encounter.      Current Outpatient Medications on File Prior to Encounter   Medication Sig    apixaban (ELIQUIS) 5 mg Tab Take 5 mg by mouth 2 (two) times daily.    calcium acetate,phosphat bind, (PHOSLO) 667 mg capsule Take 2,001 mg by mouth 3 (three) times daily with meals.     DULoxetine (CYMBALTA) 60 MG capsule Take 60 mg by mouth once daily.    famotidine (PEPCID) 20 MG tablet Take 20 mg by mouth once daily.     insulin (BASAGLAR KWIKPEN U-100 INSULIN) glargine 100 units/mL (3mL) SubQ pen Inject 25 Units into the skin once daily.     insulin lispro 100 unit/mL injection Inject 5 Units into the skin 3 (three) times daily before meals. Plus SSI    losartan (COZAAR) 25 MG tablet Take 25 mg by mouth once daily.    [DISCONTINUED] insulin glargine,hum.rec.anlog (BASAGLAR KWIKPEN U-100 INSULIN SUBQ) Inject 5 Units into the skin 3 (three) times daily with meals. Plus SSI    albuterol (PROVENTIL/VENTOLIN HFA) 90 mcg/actuation inhaler Inhale 2 puffs into the lungs every 4 (four) hours as needed.    atorvastatin (LIPITOR) 10 MG tablet Take 10 mg by mouth every evening.    cephALEXin (KEFLEX) 500 MG capsule Take 1 capsule (500 mg total) by mouth every 12 (twelve) hours. On dialysis days, take after dialysis for 10 days    fluticasone-salmeterol diskus inhaler 100-50 mcg Inhale 1 puff into the lungs 2 (two) times a day.    metoprolol tartrate (LOPRESSOR) 25 MG tablet Take 25 mg by mouth 2 (two) times a day.    oxyCODONE-acetaminophen (PERCOCET)  mg per tablet Take 1 tablet by mouth every 12 (twelve) hours as needed for Pain.      [DISCONTINUED] albuterol (PROVENTIL/VENTOLIN HFA) 90 mcg/actuation inhaler Inhale 1 puff into the lungs every 4 (four) hours as needed.     Family History     None        Tobacco Use    Smoking status: Never Smoker    Smokeless tobacco: Never Used   Substance and Sexual Activity    Alcohol use: Not Currently    Drug use: Never    Sexual activity: Not on file     Review of Systems   Constitutional: Negative for chills and fever.   HENT: Negative.    Eyes: Negative.    Respiratory: Positive for shortness of breath. Negative for cough.    Cardiovascular: Negative for chest pain and palpitations.   Gastrointestinal: Negative for abdominal distention, abdominal pain, diarrhea, nausea and vomiting.   Genitourinary: Negative.    Musculoskeletal: Negative.    Neurological: Negative.    Psychiatric/Behavioral: Negative.    All other systems reviewed and are negative.    Objective:     Vital Signs (Most Recent):  Pulse: 80 (09/10/20 1234)  Resp: (!) 22 (09/10/20 1201)  BP: (!) 114/55 (09/10/20 1233)  SpO2: 95 % (09/10/20 1233) Vital Signs (24h Range):  Pulse:  [77-87] 80  Resp:  [16-22] 22  SpO2:  [95 %-99 %] 95 %  BP: (114-160)/(55-79) 114/55        There is no height or weight on file to calculate BMI.    Physical Exam  Vitals signs and nursing note reviewed.   Constitutional:       Appearance: He is well-developed. He is obese.   HENT:      Head: Normocephalic and atraumatic.   Eyes:      General:         Right eye: No discharge.         Left eye: No discharge.      Conjunctiva/sclera: Conjunctivae normal.   Neck:      Musculoskeletal: Normal range of motion and neck supple.   Cardiovascular:      Rate and Rhythm: Normal rate and regular rhythm.      Heart sounds: Normal heart sounds.   Pulmonary:      Effort: Pulmonary effort is normal. No respiratory distress.      Comments: Decreased   Abdominal:      General: Bowel sounds are normal. There is no distension.      Palpations: Abdomen is soft.      Tenderness: There  is no abdominal tenderness.   Musculoskeletal: Normal range of motion.      Right lower leg: No edema.      Left lower leg: No edema.      Comments: L AV fistula - no thrill palpated but bruit auscultated   Skin:     General: Skin is warm and dry.   Neurological:      Mental Status: He is alert and oriented to person, place, and time. Mental status is at baseline.   Psychiatric:         Mood and Affect: Mood normal.         Behavior: Behavior normal.             Significant Labs:   CBC:   Recent Labs   Lab 09/10/20  0944 09/10/20  0944   WBC 7.81  --    HGB 10.1*  --    HCT 35.0* 34*     --      CMP:   Recent Labs   Lab 09/10/20  0944      K 6.1*      CO2 18*   *   *   CREATININE 9.0*   CALCIUM 8.7   PROT 7.7   ALBUMIN 3.3*   BILITOT 0.5   ALKPHOS 92   AST 18   ALT 11   ANIONGAP 18*   EGFRNONAA 6*     All pertinent labs within the past 24 hours have been reviewed.    Significant Imaging: I have reviewed and interpreted all pertinent imaging results/findings within the past 24 hours.

## 2020-09-10 NOTE — ED NOTES
Pt to ED via EMS with c/o dialysis access problem x today. Pt reports going to dialysis today and dialysis nurse was unable to access AV fistula in left upper arm. Limb alert applied. Pt reports being from out of town and first time going to this dialysis place. Pt reports last dialyzed Saturday in home town where he usually goes. Pt reports worsening SOB x 2 days. Pt reports BLE swelling. +2 edema noted to BLE. +1 generalized edema noted. Pt denies CP, n/v/d, fever, chills. AAOX4. RR even, unlabored.  Pt attached to cardiac monitor, pulse ox and BP cycled. Will continue to monitor.

## 2020-09-10 NOTE — ED PROVIDER NOTES
"Encounter Date: 9/10/2020    SCRIBE #1 NOTE: I, Dior Camp, am scribing for, and in the presence of, Dr. Echols.       History     Chief Complaint   Patient presents with    Vascular Access Problem     unable to get dialysis today because port was clogged. last dialysis last Saturday.      Time seen by provider: 9:42 AM    This is a 65 y.o. male with history of ESRD on dialysis TTS, visiting from [place], [place] who presents due to vascular access problem. Patient was set up to have temporary dialysis during his visit in [place]. When he went to have dialysis today, they went unable to access his LUE fistula. He denies ever having problems in the past accessing his fistula. States it is "deep in my arm" and thinks "they just didn't know where it was." Patient last had dialysis 5 days ago. He reports SOB. Denies fever, nausea, vomiting, or chest pain.    The history is provided by the patient.     Review of patient's allergies indicates:   Allergen Reactions    Gabapentin Anaphylaxis     Past Medical History:   Diagnosis Date    A-fib     COPD (chronic obstructive pulmonary disease)     Diabetes mellitus     Renal disorder      Past Surgical History:   Procedure Laterality Date    avf left arm       No family history on file.  Social History     Tobacco Use    Smoking status: Never Smoker    Smokeless tobacco: Never Used   Substance Use Topics    Alcohol use: Not Currently    Drug use: Never     Review of Systems   Constitutional: Negative for chills and fever.   HENT: Negative for congestion, sore throat and trouble swallowing.    Eyes: Negative for photophobia and visual disturbance.   Respiratory: Positive for shortness of breath. Negative for cough.    Cardiovascular: Negative for chest pain.   Gastrointestinal: Negative for abdominal pain, nausea and vomiting.   Genitourinary: Negative for dysuria and hematuria.   Musculoskeletal: Negative for back pain and neck pain.   Skin: Negative for rash and " wound.   Neurological: Negative for weakness and headaches.   Psychiatric/Behavioral: Negative.        Physical Exam     Initial Vitals   BP Pulse Resp Temp SpO2   09/10/20 0936 09/10/20 0940 09/10/20 0940 -- 09/10/20 0940   (!) 160/69 87 20  99 %      MAP       --                Physical Exam    Nursing note and vitals reviewed.  Constitutional: He appears well-developed and well-nourished. No distress.   Obese.   HENT:   Head: Normocephalic and atraumatic.   Eyes: Conjunctivae and EOM are normal.   Neck: Normal range of motion. Neck supple.   No elevated JVD.   Cardiovascular: Normal rate, regular rhythm and normal heart sounds. Exam reveals no gallop and no friction rub.    No murmur heard.  Pulmonary/Chest: Breath sounds normal. No respiratory distress. He has no wheezes. He has no rhonchi. He has no rales.   No increase work of breathing.   Abdominal: Soft. There is no abdominal tenderness. There is no rebound and no guarding.   Musculoskeletal: Normal range of motion.      Comments: Fistula present LUE, no palpable thrill. No overlying skin discoloration. Symmetrical LE edema.   Neurological: He is alert and oriented to person, place, and time. He has normal strength.   Skin: Skin is dry. No rash noted.   Psychiatric: He has a normal mood and affect. Thought content normal.         ED Course   Procedures  Labs Reviewed   CBC W/ AUTO DIFFERENTIAL - Abnormal; Notable for the following components:       Result Value    RBC 3.80 (*)     Hemoglobin 10.1 (*)     Hematocrit 35.0 (*)     Mean Corpuscular Hemoglobin 26.6 (*)     Mean Corpuscular Hemoglobin Conc 28.9 (*)     RDW 16.9 (*)     Lymph # 0.6 (*)     Gran% 79.4 (*)     Lymph% 7.9 (*)     All other components within normal limits   COMPREHENSIVE METABOLIC PANEL - Abnormal; Notable for the following components:    Potassium 6.1 (*)     CO2 18 (*)     Glucose 119 (*)     BUN, Bld 112 (*)     Creatinine 9.0 (*)     Albumin 3.3 (*)     Anion Gap 18 (*)     eGFR if   6 (*)     eGFR if non  6 (*)     All other components within normal limits   ISTAT PROCEDURE - Abnormal; Notable for the following components:    POC Potassium 5.6 (*)     POC Hematocrit 34 (*)     All other components within normal limits     EKG Readings: (Independently Interpreted)   Initial Reading: No STEMI.   Normal sinus rhythm at a rate of 82 bpm. Left anterior fascicular block. Unchanged from EKG from 2010.     ECG Results          EKG 12-lead (In process)  Result time 09/10/20 10:44:59    In process by Interface, Lab In [facility] (09/10/20 10:44:59)                 Narrative:    Test Reason : N18.6,    Vent. Rate : 082 BPM     Atrial Rate : 082 BPM     P-R Int : 176 ms          QRS Dur : 124 ms      QT Int : 394 ms       P-R-T Axes : 041 -55 069 degrees     QTc Int : 460 ms    Normal sinus rhythm  Left anterior fascicular block  Abnormal ECG      Referred By: [email or web address]   SELF           Confirmed By:                             Imaging Results    None          Medical Decision Making:   History:   Old Medical Records: I decided to obtain old medical records.  Initial Assessment:   Urgent evaluation of 65-year-old gentleman with ESRD on dialysis recently transplanted here from [place] waiting dialysis today was unable to obtain axis at [facility]. Pt had been seen at [facility] 2 days prior in prep- receiving covid testing etc. Here pt notes some mild sob, fistula reportedly bleed on attempts to access this am. No obvious resp distress or overt fluid overload. Will plan to place on obs for HD given hyperk.   Independently Interpreted Test(s):   I have ordered and independently interpreted EKG Reading(s) - see prior notes  Clinical Tests:   Lab Tests: Ordered and Reviewed  Medical Tests: Ordered and Reviewed  ED Management:  Pt seen by Nephrology and Dr Jerry made aware. Will place on obs for HD.             Scribe Attestation:   Scribe #1: I performed the above scribed  service and the documentation accurately describes the services I performed. I attest to the accuracy of the note.    Attending Attestation:           Physician Attestation for Scribe:  Physician Attestation Statement for Scribe #1: I, Dr. Echols, reviewed documentation, as scribed by Dior aCmp in my presence, and it is both accurate and complete.                           Clinical Impression:     1. Problem with vascular access    2. ESRD (end stage renal disease)    3. Hyperkalemia          Disposition:   Disposition: Placed in Observation  Condition: Fair     ED Disposition Condition    Observation                             Bridget Echols MD  09/10/20 1822

## 2020-09-11 VITALS
BODY MASS INDEX: 49.44 KG/M2 | OXYGEN SATURATION: 94 % | TEMPERATURE: 98 F | HEART RATE: 61 BPM | SYSTOLIC BLOOD PRESSURE: 127 MMHG | WEIGHT: 315 LBS | DIASTOLIC BLOOD PRESSURE: 59 MMHG | RESPIRATION RATE: 16 BRPM | HEIGHT: 67 IN

## 2020-09-11 PROBLEM — Z78.9 PROBLEM WITH VASCULAR ACCESS: Status: RESOLVED | Noted: 2020-09-10 | Resolved: 2020-09-11

## 2020-09-11 LAB
ANION GAP SERPL CALC-SCNC: 15 MMOL/L (ref 8–16)
BUN SERPL-MCNC: 64 MG/DL (ref 8–23)
CALCIUM SERPL-MCNC: 8.2 MG/DL (ref 8.7–10.5)
CHLORIDE SERPL-SCNC: 101 MMOL/L (ref 95–110)
CO2 SERPL-SCNC: 22 MMOL/L (ref 23–29)
CREAT SERPL-MCNC: 6.1 MG/DL (ref 0.5–1.4)
ERYTHROCYTE [DISTWIDTH] IN BLOOD BY AUTOMATED COUNT: 16.8 % (ref 11.5–14.5)
EST. GFR  (AFRICAN AMERICAN): 10 ML/MIN/1.73 M^2
EST. GFR  (NON AFRICAN AMERICAN): 9 ML/MIN/1.73 M^2
GLUCOSE SERPL-MCNC: 111 MG/DL (ref 70–110)
HCT VFR BLD AUTO: 31 % (ref 40–54)
HGB BLD-MCNC: 9 G/DL (ref 14–18)
MAGNESIUM SERPL-MCNC: 1.9 MG/DL (ref 1.6–2.6)
MCH RBC QN AUTO: 26.4 PG (ref 27–31)
MCHC RBC AUTO-ENTMCNC: 29 G/DL (ref 32–36)
MCV RBC AUTO: 91 FL (ref 82–98)
PHOSPHATE SERPL-MCNC: 5.6 MG/DL (ref 2.7–4.5)
PLATELET # BLD AUTO: 175 K/UL (ref 150–350)
PMV BLD AUTO: 9.7 FL (ref 9.2–12.9)
POCT GLUCOSE: 45 MG/DL (ref 70–110)
POCT GLUCOSE: 67 MG/DL (ref 70–110)
POCT GLUCOSE: 92 MG/DL (ref 70–110)
POTASSIUM SERPL-SCNC: 4.8 MMOL/L (ref 3.5–5.1)
RBC # BLD AUTO: 3.41 M/UL (ref 4.6–6.2)
SODIUM SERPL-SCNC: 138 MMOL/L (ref 136–145)
WBC # BLD AUTO: 6.29 K/UL (ref 3.9–12.7)

## 2020-09-11 PROCEDURE — 83735 ASSAY OF MAGNESIUM: CPT

## 2020-09-11 PROCEDURE — 99217 PR OBSERVATION CARE DISCHARGE: ICD-10-PCS | Mod: ,,, | Performed by: INTERNAL MEDICINE

## 2020-09-11 PROCEDURE — 36415 COLL VENOUS BLD VENIPUNCTURE: CPT

## 2020-09-11 PROCEDURE — 85027 COMPLETE CBC AUTOMATED: CPT

## 2020-09-11 PROCEDURE — 25000003 PHARM REV CODE 250: Performed by: INTERNAL MEDICINE

## 2020-09-11 PROCEDURE — 84100 ASSAY OF PHOSPHORUS: CPT

## 2020-09-11 PROCEDURE — 25000242 PHARM REV CODE 250 ALT 637 W/ HCPCS: Performed by: INTERNAL MEDICINE

## 2020-09-11 PROCEDURE — 94640 AIRWAY INHALATION TREATMENT: CPT

## 2020-09-11 PROCEDURE — G0378 HOSPITAL OBSERVATION PER HR: HCPCS

## 2020-09-11 PROCEDURE — 80048 BASIC METABOLIC PNL TOTAL CA: CPT

## 2020-09-11 PROCEDURE — 94761 N-INVAS EAR/PLS OXIMETRY MLT: CPT

## 2020-09-11 PROCEDURE — 99217 PR OBSERVATION CARE DISCHARGE: CPT | Mod: ,,, | Performed by: INTERNAL MEDICINE

## 2020-09-11 RX ADMIN — FLUTICASONE FUROATE AND VILANTEROL TRIFENATATE 1 PUFF: 100; 25 POWDER RESPIRATORY (INHALATION) at 07:09

## 2020-09-11 RX ADMIN — APIXABAN 5 MG: 2.5 TABLET, FILM COATED ORAL at 08:09

## 2020-09-11 RX ADMIN — LOSARTAN POTASSIUM 25 MG: 25 TABLET ORAL at 08:09

## 2020-09-11 RX ADMIN — DULOXETINE HYDROCHLORIDE 60 MG: 30 CAPSULE, DELAYED RELEASE ORAL at 08:09

## 2020-09-11 RX ADMIN — METOPROLOL TARTRATE 25 MG: 25 TABLET, FILM COATED ORAL at 08:09

## 2020-09-11 RX ADMIN — FAMOTIDINE 20 MG: 20 TABLET ORAL at 08:09

## 2020-09-11 RX ADMIN — MUPIROCIN: 20 OINTMENT TOPICAL at 08:09

## 2020-09-11 RX ADMIN — CALCIUM ACETATE 2001 MG: 667 CAPSULE ORAL at 08:09

## 2020-09-11 NOTE — PROGRESS NOTES
"Nephrology  Progress Note    Admit Date: 9/10/2020   LOS: 0 days     SUBJECTIVE:     Follow-up For:  Problem with vascular access    Interval History:     Did well with dialysis yesterday.  Venous track is deep but able to cannulate.  Uneventful night.  Denies CP/SOB.  Discussed with team.    Review of Systems:  Constitutional: No fever or chills  Respiratory: No cough or shortness of breath  Cardiovascular: No chest pain or palpitations  Gastrointestinal: No nausea or vomiting  Neurological: No confusion or weakness    OBJECTIVE:     Vital Signs Range (Last 24H):  BP (!) 127/59 (BP Location: Right arm, Patient Position: Sitting)   Pulse 61   Temp 97.9 °F (36.6 °C) (Oral)   Resp 16   Ht 5' 7" (1.702 m)   Wt (!) 146.9 kg (323 lb 13.7 oz)   SpO2 (!) 94%   BMI 50.72 kg/m²     Temp:  [96.8 °F (36 °C)-97.9 °F (36.6 °C)]   Pulse:  [60-92]   Resp:  [10-24]   BP: (111-174)/(55-92)   SpO2:  [93 %-100 %]     I & O (Last 24H):    Intake/Output Summary (Last 24 hours) at 9/11/2020 0939  Last data filed at 9/10/2020 2100  Gross per 24 hour   Intake 860 ml   Output 2500 ml   Net -1640 ml       Physical Exam:  General appearance: Well developed, well nourished  Eyes:  Conjunctivae/corneas clear. PERRL.  Lungs: Normal respiratory effort,   clear to auscultation bilaterally   Heart: Afib rate and rhythm, S1, S2 normal, no murmur, rub or ingrid.  Abdomen: Soft, non-tender non-distended; bowel sounds normal; no masses,  no organomegaly, obese  Extremities: No cyanosis or clubbing. No edema.    Skin: Skin color, texture, turgor normal. No rashes or lesions  Neurologic: Normal strength and tone. No focal numbness or weakness  Left arm AV fistula with very deep track.  Positive thrill and bruit.     Laboratory Data:  Recent Labs   Lab 09/11/20  0410   WBC 6.29   RBC 3.41*   HGB 9.0*   HCT 31.0*      MCV 91   MCH 26.4*   MCHC 29.0*       BMP:   Recent Labs   Lab 09/11/20  0410   *      K 4.8      CO2 22* "   BUN 64*   CREATININE 6.1*   CALCIUM 8.2*   MG 1.9     Lab Results   Component Value Date    CALCIUM 8.2 (L) 09/11/2020    PHOS 5.6 (H) 09/11/2020       Lab Results   Component Value Date    CALCIUM 8.2 (L) 09/11/2020    PHOS 5.6 (H) 09/11/2020       No results found for: URICACID    BNP  No results for input(s): BNP, BNPTRIAGEBLO in the last 168 hours.    Medications:  Medication list was reviewed and changes noted under Assessment/Plan.    Diagnostic Results:        ASSESSMENT/PLAN:     1. End-stage renal disease on hemodialysis Tuesday Thursday and Saturday and [place] but here for visitation with malfunctioning AV fistula (N18.6, Z99.2, Z78.9):  Patient has been on dialysis for a little over a year secondary to diabetic and hypertensive nephrosclerosis.  According to patient his outpatient unit in [place] does not have trouble accessing his fistula.  Patient currently at temporary location of [facility] with Dr. Le.  I discussed with him and with U interventional team about plans.  Consent signed for dialysis.  Updated dialysis RN.  Able to cannulate yesterday with deep venous track.   See orders. Renally dose meds, avoid nephrotoxins, and monitor I/O's closely.  2. Mild hyperkalemia (E87.5):  s/p lokelma and correction with HD    3. COPD:  Defer.  4. Anemia of CKD:  At goal.   5. Afib on Eliquis:  Defer.        Ok to DC.  Thanks for consult.

## 2020-09-11 NOTE — PLAN OF CARE
Patient on RA. Sats 100%. Bipap qhs, settings as documented. No prn tx reqiured. Pt. in no distress, will continue to monitor.

## 2020-09-11 NOTE — HOSPITAL COURSE
Pt hospitalized for dialysis after outpatient unit was unable to access his AV fistula. He has a deep set fistula but was able to be accessed and he had dialysis without issue. He feels well today and is stable for discharge home.

## 2020-09-11 NOTE — PLAN OF CARE
Pt remained free from falls or injuries this shift. Pt independent in repositioning. No skin breakdown noticed. Pt rested well through the night. Wore CPAP to sleep. Pt completed HD removing 2L prior to arrival to floor. CBG 58 before dinner due to pt missing lunch. Improved after eating. No complaints through the night

## 2020-09-11 NOTE — PROGRESS NOTES
Discharge instructions printed, reviewed, and provided to pt. Pt provided verbal understanding. IV removed without complications. All belongings sent with pt. Transport ordered.

## 2020-09-11 NOTE — DISCHARGE SUMMARY
[facility] Hospital Medicine  Discharge Summary      Patient Name: Roddy Justice  MRN: [id number]  Admission Date: 9/10/2020  Hospital Length of Stay: 0 days  Discharge Date and Time:  09/11/2020 9:03 AM  Attending Physician: Karina Salmon MD   Discharging Provider: Karina Salmon MD  Primary Care Provider: Primary Doctor No      HPI:   64 y/o M with history of ESRD on TTS dialysis presents to the ED today from outpatient dialysis unit with difficulty accessing AV fistula. He is visiting from [place], [place] due to a sick nephew. He went to outpatient unit today and they were unable to access his fistula so he was referred to the ED. He complains of worsening SOB over the past 3 days due to missed HD. His last dialysis was 5 days ago. He was seen in the ED at [facility] on 9/8 because he needed a COVID test prior to having dialysis. At the time he also had dysuria, treated for UTI and symptoms have since resolved. He denies chest pain, cough, fever, chills, nausea or vomiting.    He has history of diastolic CHF, COPD, type 2 diabetes mellitus, ESRD on TTS HD, HTN, atrial fibrillation on chronic anticoagulation and SILVINA on BiPAP and 3 L nasal cannula as needed at home.    Surgical history: b/l toe amputations, prior trach placement, L AV fistula  Social history: lives with his common law wife, no children. Denies alcohol or drug use. Prior tobacco user, quit 10 years ago  Family history: mother and father with DM2 and HTN      * No surgery found *      Hospital Course:   Pt hospitalized for dialysis after outpatient unit was unable to access his AV fistula. He has a deep set fistula but was able to be accessed and he had dialysis without issue. He feels well today and is stable for discharge home.     Consults:   Consults (From admission, onward)        Status Ordering Provider     Inpatient consult to Nephrology  Once     Provider:  MD Dimitrios Hawkins Jr., DANA No new  Assessment & Plan notes have been filed under this hospital service since the last note was generated.  Service: Hospital Medicine    Final Active Diagnoses:    Diagnosis Date Noted POA    Essential hypertension [I10] 09/10/2020 Yes    Chronic diastolic CHF (congestive heart failure) [I50.32] 09/10/2020 Yes    ESRD (end stage renal disease) [N18.6] 09/10/2020 Yes    Type 2 diabetes mellitus, with long-term current use of insulin [E11.9, Z79.4] 09/10/2020 Not Applicable    COPD (chronic obstructive pulmonary disease) [J44.9] 09/10/2020 Yes    Paroxysmal atrial fibrillation [I48.0] 09/10/2020 Yes    SILVINA (obstructive sleep apnea) [G47.33] 03/14/2018 Yes      Problems Resolved During this Admission:    Diagnosis Date Noted Date Resolved POA    PRINCIPAL PROBLEM:  Problem with vascular access [Z78.9] 09/10/2020 09/11/2020 Yes       Discharged Condition: good    Disposition: Home or Self Care    Follow Up:    Patient Instructions:      Diet renal     Diet diabetic     Activity as tolerated       Significant Diagnostic Studies: Labs: All labs within the past 24 hours have been reviewed    Pending Diagnostic Studies:     None         Medications:  Reconciled Home Medications:      Medication List      CONTINUE taking these medications    albuterol 90 mcg/actuation inhaler  Commonly known as: PROVENTIL/VENTOLIN HFA  Inhale 2 puffs into the lungs every 4 (four) hours as needed.     atorvastatin 10 MG tablet  Commonly known as: LIPITOR  Take 10 mg by mouth every evening.     BASAGLAR KWIKPEN U-100 INSULIN glargine 100 units/mL (3mL) SubQ pen  Generic drug: insulin  Inject 25 Units into the skin once daily.     calcium acetate(phosphat bind) 667 mg capsule  Commonly known as: PHOSLO  Take 2,001 mg by mouth 3 (three) times daily with meals.     DULoxetine 60 MG capsule  Commonly known as: CYMBALTA  Take 60 mg by mouth once daily.     ELIQUIS 5 mg Tab  Generic drug: apixaban  Take 5 mg by mouth 2 (two) times daily.      famotidine 20 MG tablet  Commonly known as: PEPCID  Take 20 mg by mouth once daily.     fluticasone-salmeterol 100-50 mcg/dose 100-50 mcg/dose diskus inhaler  Commonly known as: ADVAIR  Inhale 1 puff into the lungs 2 (two) times a day.     insulin lispro 100 unit/mL injection  Inject 5 Units into the skin 3 (three) times daily before meals. Plus SSI     losartan 25 MG tablet  Commonly known as: COZAAR  Take 25 mg by mouth once daily.     metoprolol tartrate 25 MG tablet  Commonly known as: LOPRESSOR  Take 25 mg by mouth 2 (two) times a day.     oxyCODONE-acetaminophen  mg per tablet  Commonly known as: PERCOCET  Take 1 tablet by mouth every 12 (twelve) hours as needed for Pain.        STOP taking these medications    cephALEXin 500 MG capsule  Commonly known as: KEFLEX            Indwelling Lines/Drains at time of discharge:   Lines/Drains/Airways     Drain                 Hemodialysis AV Fistula Left upper arm -- days                Time spent on the discharge of patient: 25 minutes  Patient was seen and examined on the date of discharge and determined to be suitable for discharge.         Karina Salmon MD  Department of Hospital Medicine  [facility]

## 2020-09-11 NOTE — PLAN OF CARE
Patient will discharge home     Family will transport home     No need at discharge           09/11/20 1051   Final Note   Assessment Type Final Discharge Note   Anticipated Discharge Disposition Home   What phone number can be called within the next 1-3 days to see how you are doing after discharge? [phone]   Hospital Follow Up  Appt(s) scheduled? No   Discharge plans and expectations educations in teach back method with documentation complete? Yes

## 2020-11-15 NOTE — HPI
64 y/o M with history of ESRD on TTS dialysis presents to the ED today from outpatient dialysis unit with difficulty accessing AV fistula. He is visiting from [place], [place] due to a sick nephew. He went to outpatient unit today and they were unable to access his fistula so he was referred to the ED. He complains of worsening SOB over the past 3 days due to missed HD. His last dialysis was 5 days ago. He was seen in the ED at [facility] on 9/8 because he needed a COVID test prior to having dialysis. At the time he also had dysuria, treated for UTI and symptoms have since resolved. He denies chest pain, cough, fever, chills, nausea or vomiting.    He has history of diastolic CHF, COPD, type 2 diabetes mellitus, ESRD on TTS HD, HTN, atrial fibrillation on chronic anticoagulation and SILVINA on BiPAP and 3 L nasal cannula as needed at home.    Surgical history: b/l toe amputations, prior trach placement, L AV fistula  Social history: lives with his common law wife, no children. Denies alcohol or drug use. Prior tobacco user, quit 10 years ago  Family history: mother and father with DM2 and HTN    
right ankle bimalleolar equivalent fracture  open reduction and internal fixation

## 2023-01-05 NOTE — ED TRIAGE NOTES
Patient is visiting from out of town and needs to have dialysis.  Patient states he has not had dialysis since Saturday. He is Tues, Wed, Sat.  He is from [place], [place].     Detail Level: Detailed Detail Level: Zone Detail Level: Generalized